# Patient Record
Sex: FEMALE | Race: BLACK OR AFRICAN AMERICAN | URBAN - METROPOLITAN AREA
[De-identification: names, ages, dates, MRNs, and addresses within clinical notes are randomized per-mention and may not be internally consistent; named-entity substitution may affect disease eponyms.]

---

## 2023-01-17 ENCOUNTER — APPOINTMENT (OUTPATIENT)
Dept: PHYSICAL THERAPY | Age: 83
End: 2023-01-17

## 2023-05-02 ENCOUNTER — HOSPITAL ENCOUNTER (OUTPATIENT)
Facility: HOSPITAL | Age: 83
Setting detail: RECURRING SERIES
Discharge: HOME | End: 2023-05-05
Payer: MEDICARE

## 2023-05-02 PROCEDURE — 97162 PT EVAL MOD COMPLEX 30 MIN: CPT

## 2023-05-02 PROCEDURE — 97112 NEUROMUSCULAR REEDUCATION: CPT

## 2023-05-02 NOTE — PROGRESS NOTES
[address] [facility] PHYSICAL THERAPY  [address] [facility], [address], [place], [address] Ph: [phone] Fx: [phone]  Plan of Care / Statement of Necessity for Physical Therapy Services     Patient Name: Berny Castelan : 1940   Medical   Diagnosis: Other abnormalities of gait and mobility [R26.89]  Dizziness and giddiness [R42] Treatment Diagnosis:   R26.89  Abnormalities of gait and mobility    Onset Date:      Referral Source: Yosvany Kenyon MD [facility]): 2023   Prior Hospitalization: See medical history Provider #: [id number]   Prior Level of Function: Amb, transfers with no vertigo or dizziness   Comorbidities: See medical history   Medications: Verified on Patient Summary List      Assessment / boucher information:   Berny Castelan is a 82 y.o.  yo female with Dx of Other abnormalities of gait and mobility [R26.89]  Dizziness and giddiness [R42]. She reports dizziness, hearing loss, nausea, vertigo and imbalance with function. She reports R ear \"buzzing\". Objectively, the patient demonstrates  the following:  Balance: Decreased static balance: SLS: R= 3sec, L= <3 sec, diminished ambulatory balance. Pt with significant difficulty performing current activity including oculomotor testing. States she is feeling symptomatic this day, will complete testing at next visit. Pt would benefit from PT/vestibular rehab to improve function and safety in home and community. Thanks you for this referral.    Evaluation Complexity:  History:  MEDIUM  Complexity : 1-2 comorbidities / personal factors will impact the outcome/ POC ; Examination:  MEDIUM Complexity : 3 Standardized tests and measures addressin body structure, function, activity limitation and / or participation in recreation  ;Presentation:  MEDIUM Complexity : Evolving with changing characteristics  ; Clinical Decision Making:  MEDIUM Complexity : FOTO score of 26-74 FOTO score = an established functional score
address strength deficits, analyze and address soft tissue restrictions, analyze and cue for proper movement patterns, analyze and modify for postural abnormalities, and instruct in home and community integration to address functional deficits and attain remaining goals. Progress toward goals / Updated goals:  []  See Progress Note/Recertification    See POC.     PLAN  Yes  Continue plan of care  []  Upgrade activities as tolerated  []  Discharge due to :  []  Other:    Sheldon Maria, PT    5/2/2023    6:15 AM    Future Appointments   Date Time Provider David Rhodes   5/2/2023 11:00 AM Dariana Alfaro, PT [facility]

## 2023-05-05 ENCOUNTER — HOSPITAL ENCOUNTER (OUTPATIENT)
Facility: HOSPITAL | Age: 83
Setting detail: RECURRING SERIES
Discharge: HOME | End: 2023-05-08
Payer: MEDICARE

## 2023-05-05 PROCEDURE — 97530 THERAPEUTIC ACTIVITIES: CPT

## 2023-05-05 PROCEDURE — 97112 NEUROMUSCULAR REEDUCATION: CPT

## 2023-05-09 ENCOUNTER — HOSPITAL ENCOUNTER (OUTPATIENT)
Facility: HOSPITAL | Age: 83
Setting detail: RECURRING SERIES
Discharge: HOME | End: 2023-05-12
Payer: MEDICARE

## 2023-05-09 PROCEDURE — 97530 THERAPEUTIC ACTIVITIES: CPT

## 2023-05-09 PROCEDURE — 97112 NEUROMUSCULAR REEDUCATION: CPT

## 2023-05-09 NOTE — PROGRESS NOTES
PHYSICAL / OCCUPATIONAL THERAPY - DAILY TREATMENT NOTE (updated )    Patient Name: Tessy Perdomo    Date: 2023    : 1940  Insurance: Payor: MEDICARE / Plan: MEDICARE PART A AND B / Product Type: *No Product type* /      Patient  verified yes     Visit #   Current / Total 3 8-12   Time   In / Out 11 1140   Pain   In / Out 8 <8   Subjective Functional Status/Changes: Buzzing really bad. Afraid to move head, don't want buzzing to get worse. In bed all day yesterday. Couldn't do ex's over the weekend. So bad on  I had to have someone pick me up from [employer]. Changes to:  Meds, Allergies, Med Hx, Sx Hx? If yes, update Summary List no     TREATMENT AREA =  Other abnormalities of gait and mobility [R26.89]  Dizziness and giddiness [R42]    OBJECTIVE        Therapeutic Procedures: Tx Min Billable or 1:1 Min (if diff from Tx Min) Procedure, Rationale, Specifics   30 30 [id number] Neuromuscular Re-Education (timed):  improve balance, coordination, kinesthetic sense, posture, core stability and proprioception to improve patient's ability to develop conscious control of individual muscles and awareness of position of extremities in order to progress to PLOF and address remaining functional goals. (see flow sheet as applicable)     Details if applicable:  sign ed on HEP, A & P     10 [phone] [id number] Therapeutic Activity (timed):  use of dynamic activities replicating functional movements to increase ROM, strength, coordination, balance, and proprioception in order to improve patient's ability to progress to PLOF and address remaining functional goals.   (see flow sheet as applicable)     Details if applicable:                    40 40 [address] Reminder: bill using total billable min of TIMED therapeutic procedures (example: do not include dry needle or estim unattended, both untimed codes, in totals to left)  8-22 min = 1 unit; 23-37 min = 2 units; 38-52 min = 3 units; 53-67 min = 4 units; 68-82 min = 5

## 2023-05-11 ENCOUNTER — HOSPITAL ENCOUNTER (OUTPATIENT)
Facility: HOSPITAL | Age: 83
Setting detail: RECURRING SERIES
Discharge: HOME | End: 2023-05-14
Payer: MEDICARE

## 2023-05-11 PROCEDURE — 97530 THERAPEUTIC ACTIVITIES: CPT

## 2023-05-11 PROCEDURE — 97112 NEUROMUSCULAR REEDUCATION: CPT

## 2023-05-16 ENCOUNTER — APPOINTMENT (OUTPATIENT)
Facility: HOSPITAL | Age: 83
End: 2023-05-16
Payer: MEDICARE

## 2023-05-18 ENCOUNTER — APPOINTMENT (OUTPATIENT)
Facility: HOSPITAL | Age: 83
End: 2023-05-18
Payer: MEDICARE

## 2023-05-23 ENCOUNTER — APPOINTMENT (OUTPATIENT)
Facility: HOSPITAL | Age: 83
End: 2023-05-23
Payer: MEDICARE

## 2023-05-25 ENCOUNTER — APPOINTMENT (OUTPATIENT)
Facility: HOSPITAL | Age: 83
End: 2023-05-25
Payer: MEDICARE